# Patient Record
Sex: MALE | Race: WHITE | NOT HISPANIC OR LATINO | Employment: OTHER | URBAN - METROPOLITAN AREA
[De-identification: names, ages, dates, MRNs, and addresses within clinical notes are randomized per-mention and may not be internally consistent; named-entity substitution may affect disease eponyms.]

---

## 2017-02-15 ENCOUNTER — ALLSCRIPTS OFFICE VISIT (OUTPATIENT)
Dept: OTHER | Facility: OTHER | Age: 82
End: 2017-02-15

## 2017-02-15 DIAGNOSIS — R13.10 DYSPHAGIA: ICD-10-CM

## 2017-02-22 ENCOUNTER — OFFICE VISIT (OUTPATIENT)
Dept: SPEECH THERAPY | Facility: CLINIC | Age: 82
End: 2017-02-22
Payer: MEDICARE

## 2017-02-22 PROCEDURE — 92610 EVALUATE SWALLOWING FUNCTION: CPT

## 2017-02-22 PROCEDURE — G8996 SWALLOW CURRENT STATUS: HCPCS

## 2017-02-22 PROCEDURE — G8997 SWALLOW GOAL STATUS: HCPCS

## 2017-02-23 ENCOUNTER — GENERIC CONVERSION - ENCOUNTER (OUTPATIENT)
Dept: OTHER | Facility: OTHER | Age: 82
End: 2017-02-23

## 2017-03-03 ENCOUNTER — GENERIC CONVERSION - ENCOUNTER (OUTPATIENT)
Dept: OTHER | Facility: OTHER | Age: 82
End: 2017-03-03

## 2017-03-03 ENCOUNTER — HOSPITAL ENCOUNTER (OUTPATIENT)
Dept: RADIOLOGY | Facility: HOSPITAL | Age: 82
Discharge: HOME | End: 2017-03-03
Attending: FAMILY MEDICINE
Payer: MEDICARE

## 2017-03-03 DIAGNOSIS — R13.10 DYSPHAGIA: ICD-10-CM

## 2017-03-03 PROCEDURE — 92611 MOTION FLUOROSCOPY/SWALLOW: CPT

## 2017-03-03 PROCEDURE — G8996 SWALLOW CURRENT STATUS: HCPCS

## 2017-03-03 PROCEDURE — 74230 X-RAY XM SWLNG FUNCJ C+: CPT

## 2017-03-03 PROCEDURE — G8997 SWALLOW GOAL STATUS: HCPCS

## 2017-03-03 PROCEDURE — G8998 SWALLOW D/C STATUS: HCPCS

## 2017-03-10 ENCOUNTER — GENERIC CONVERSION - ENCOUNTER (OUTPATIENT)
Dept: OTHER | Facility: OTHER | Age: 82
End: 2017-03-10

## 2017-03-30 ENCOUNTER — ALLSCRIPTS OFFICE VISIT (OUTPATIENT)
Dept: OTHER | Facility: OTHER | Age: 82
End: 2017-03-30
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
## 2017-03-30 DIAGNOSIS — R26.9 ABNORMALITY OF GAIT AND MOBILITY: ICD-10-CM

## 2017-04-24 ENCOUNTER — GENERIC CONVERSION - ENCOUNTER (OUTPATIENT)
Dept: OTHER | Facility: OTHER | Age: 82
End: 2017-04-24

## 2017-06-02 ENCOUNTER — GENERIC CONVERSION - ENCOUNTER (OUTPATIENT)
Dept: OTHER | Facility: OTHER | Age: 82
End: 2017-06-02

## 2017-07-12 ENCOUNTER — ALLSCRIPTS OFFICE VISIT (OUTPATIENT)
Dept: OTHER | Facility: OTHER | Age: 82
End: 2017-07-12

## 2017-07-12 DIAGNOSIS — E11.9 TYPE 2 DIABETES MELLITUS WITHOUT COMPLICATIONS (HCC): ICD-10-CM

## 2017-07-12 DIAGNOSIS — F29 PSYCHOSIS NOT DUE TO SUBSTANCE OR KNOWN PHYSIOLOGICAL CONDITION (HCC): ICD-10-CM

## 2017-07-12 DIAGNOSIS — F79 INTELLECTUAL DISABILITY: ICD-10-CM

## 2017-08-02 ENCOUNTER — ALLSCRIPTS OFFICE VISIT (OUTPATIENT)
Dept: OTHER | Facility: OTHER | Age: 82
End: 2017-08-02

## 2017-08-04 ENCOUNTER — GENERIC CONVERSION - ENCOUNTER (OUTPATIENT)
Dept: OTHER | Facility: OTHER | Age: 82
End: 2017-08-04

## 2017-08-28 ENCOUNTER — GENERIC CONVERSION - ENCOUNTER (OUTPATIENT)
Dept: OTHER | Facility: OTHER | Age: 82
End: 2017-08-28

## 2017-09-11 ENCOUNTER — APPOINTMENT (OUTPATIENT)
Dept: AUDIOLOGY | Age: 82
End: 2017-09-11
Payer: MEDICARE

## 2017-10-11 ENCOUNTER — GENERIC CONVERSION - ENCOUNTER (OUTPATIENT)
Dept: OTHER | Facility: OTHER | Age: 82
End: 2017-10-11

## 2017-10-25 ENCOUNTER — ALLSCRIPTS OFFICE VISIT (OUTPATIENT)
Dept: OTHER | Facility: OTHER | Age: 82
End: 2017-10-25

## 2017-11-29 ENCOUNTER — GENERIC CONVERSION - ENCOUNTER (OUTPATIENT)
Dept: OTHER | Facility: OTHER | Age: 82
End: 2017-11-29

## 2017-12-15 ENCOUNTER — APPOINTMENT (EMERGENCY)
Dept: RADIOLOGY | Facility: HOSPITAL | Age: 82
End: 2017-12-15
Payer: MEDICARE

## 2017-12-15 ENCOUNTER — HOSPITAL ENCOUNTER (EMERGENCY)
Facility: HOSPITAL | Age: 82
Discharge: HOME | End: 2017-12-15
Attending: EMERGENCY MEDICINE | Admitting: EMERGENCY MEDICINE
Payer: MEDICARE

## 2017-12-15 VITALS
BODY MASS INDEX: 23.81 KG/M2 | RESPIRATION RATE: 20 BRPM | WEIGHT: 134.4 LBS | HEIGHT: 63 IN | TEMPERATURE: 97.7 F | DIASTOLIC BLOOD PRESSURE: 84 MMHG | HEART RATE: 76 BPM | OXYGEN SATURATION: 96 % | SYSTOLIC BLOOD PRESSURE: 192 MMHG
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
## 2017-12-15 PROCEDURE — 70450 CT HEAD/BRAIN W/O DYE: CPT

## 2017-12-15 PROCEDURE — 99284 EMERGENCY DEPT VISIT MOD MDM: CPT

## 2017-12-15 PROCEDURE — 72125 CT NECK SPINE W/O DYE: CPT

## 2017-12-15 PROCEDURE — 72170 X-RAY EXAM OF PELVIS: CPT

## 2017-12-15 PROCEDURE — 72070 X-RAY EXAM THORAC SPINE 2VWS: CPT

## 2017-12-15 RX ORDER — HYDROCHLOROTHIAZIDE 12.5 MG/1
12.5 CAPSULE, GELATIN COATED ORAL DAILY
COMMUNITY

## 2017-12-15 RX ORDER — OXYBUTYNIN CHLORIDE 5 MG/1
5 TABLET, EXTENDED RELEASE ORAL
COMMUNITY

## 2017-12-15 NOTE — ED PROVIDER NOTES
History  Chief Complaint   Patient presents with    Fall     Pt states was trying to get to computer and lost balance fell back hit head on carpet  Pt c/o posterior head pain     HPI    This is an 81 yo patient who presents with losing his balance anf falling, hitting his head  He has MR and resides in nursing facility  He had witnessed fall which they believe is secondary to losing his balance  He complained of posterior head pain  No blood thinners  No chest pain or sob, abdominal pain, urinary compliants  He is acting his baseline according to the manager at bedside  No compliants currently  No weakness  According to manager patient does fall when losing balance which has been chronic  Impression: 81 yo M after a fall, CT and xray and if stable at baseline will discharge home  Prior to Admission Medications   Prescriptions Last Dose Informant Patient Reported? Taking? ARIPiprazole (ABILIFY) 5 mg tablet   Yes Yes   Sig: Take 5 mg by mouth daily  Cholecalciferol (D3 ADULT PO)   Yes Yes   Sig: Take 2,000 Units by mouth daily  LORazepam (ATIVAN) 0 5 mg tablet   Yes Yes   Sig: Take 0 5 mg by mouth daily  alendronate (FOSAMAX) 70 mg tablet   Yes Yes   Sig: Take 70 mg by mouth every 7 days  Take in the morning with a full glass of water, on an empty stomach, and do not take anything else by mouth or lie down for the next 30 min  Last dose sat  1/9   aspirin 325 mg tablet   Yes Yes   Sig: Take 325 mg by mouth daily  benztropine (COGENTIN) 0 5 mg tablet   Yes Yes   Sig: Take 0 5 mg by mouth daily  budesonide-formoterol (SYMBICORT) 160-4 5 mcg/act inhaler   Yes Yes   Sig: Inhale 2 puffs 2 (two) times a day  calcium acetate (PHOSLO) 667 mg capsule   Yes Yes   Sig: Take 667 mg by mouth 2 (two) times a day     doxazosin (CARDURA) 2 mg tablet   Yes Yes   Sig: Take 2 mg by mouth daily at bedtime  ferrous sulfate 325 (65 Fe) mg tablet   Yes Yes   Sig: Take 325 mg by mouth daily with breakfast Monday      fluticasone (FLONASE) 50 mcg/act nasal spray   Yes Yes   Si spray into each nostril daily  hydrochlorothiazide (MICROZIDE) 12 5 mg capsule   Yes Yes   Sig: Take 12 5 mg by mouth daily   levothyroxine 50 mcg tablet   Yes Yes   Sig: Take 50 mcg by mouth daily  loratadine (CLARITIN) 10 mg tablet   Yes Yes   Sig: Take 10 mg by mouth daily  lovastatin (MEVACOR) 40 MG tablet   Yes Yes   Sig: Take 40 mg by mouth daily at bedtime  metFORMIN (GLUCOPHAGE) 1000 MG tablet   Yes Yes   Sig: Take 1,000 mg by mouth 2 (two) times a day with meals  montelukast (SINGULAIR) 10 mg tablet   Yes Yes   Sig: Take 10 mg by mouth daily At 4pm    omeprazole (PriLOSEC) 20 mg delayed release capsule   Yes Yes   Sig: Take 20 mg by mouth daily  oxybutynin (DITROPAN-XL) 5 mg 24 hr tablet   Yes Yes   Sig: Take 5 mg by mouth daily at bedtime   sertraline (ZOLOFT) 50 mg tablet   Yes Yes   Sig: Take 50 mg by mouth daily  sodium chloride (OCEAN) 0 65 % nasal spray   Yes Yes   Si spray into each nostril 3 (three) times a day as needed for congestion  tamsulosin (FLOMAX) 0 4 mg   Yes Yes   Sig: Take 0 4 mg by mouth daily with dinner  Facility-Administered Medications: None       Past Medical History:   Diagnosis Date    Angiodysplasia     Asthma     COPD (chronic obstructive pulmonary disease) (HCC)     Depression     Diabetes mellitus (HCC)     Disease of thyroid gland     Hypothyrodism    GERD (gastroesophageal reflux disease)     Heart murmur     Hyperlipidemia     Hypertension     Iron deficiency anemia     Osteoporosis        Past Surgical History:   Procedure Laterality Date     CYSTOURETHRO W/IMPLANT N/A 2016    Procedure: CYSTOSCOPY WITH INSERTION Gisselle Robison;  Surgeon: Ave Johnson MD;  Location: [facility];  Service: Urology       History reviewed  No pertinent family history  I have reviewed and agree with the history as documented      Social History   Substance Use Topics  Smoking status: Never Smoker    Smokeless tobacco: Never Used    Alcohol use No        Review of Systems  REVIEW OF SYSTEMS  Constitutional:  Denies fever or chills   Eyes:  Denies change in visual acuity   HENT:  Denies nasal congestion or sore throat   Respiratory:  Denies cough or shortness of breath   Cardiovascular:  Denies chest pain or edema   GI:  Denies abdominal pain, nausea, vomiting, bloody stools or diarrhea   :  Denies dysuria   Musculoskeletal:  Denies back pain or joint pain   Integument:  Denies rash   Neurologic:  Denies headache, focal weakness or sensory changes   Endocrine:  Denies polyuria or polydipsia   Lymphatic:  Denies swollen glands   Psychiatric:  Denies depression or anxiety     Physical Exam  ED Triage Vitals [12/15/17 1318]   Temperature Pulse Respirations Blood Pressure SpO2   97 7 °F (36 5 °C) 71 18 (!) 213/85 98 %      Temp src Heart Rate Source Patient Position - Orthostatic VS BP Location FiO2 (%)   -- Monitor Lying Right arm --      Pain Score       4           Orthostatic Vital Signs  Vitals:    12/15/17 1446 12/15/17 1530 12/15/17 1600 12/15/17 1630   BP: (!) 178/74 170/77 (!) 181/85 (!) 192/84   Pulse: 72 70 70 76   Patient Position - Orthostatic VS: Lying Sitting Sitting Lying       Physical Exam  PHYSICAL EXAM    Constitutional:  Well developed, well nourished, no acute distress, non-toxic appearance    HEENT:  Atraumatic, PERRL, conjunctiva normal  Oropharynx moist, no pharyngeal exudates  Neck- normal range of motion, no tenderness, supple   Respiratory:  No respiratory distress, normal breath sounds, no rales, no wheezing   Cardiovascular:  Normal rate, normal rhythm, no murmurs, no gallops, no rubs   GI:  Soft, nondistended, normal bowel sounds, nontender, no organomegaly, no mass, no rebound, no guarding   :  No costovertebral angle tenderness   Musculoskeletal:  No edema, no tenderness, no deformities   Back- small reddness midline back but no tenderness  Integument:  Well hydrated, no rash   Lymphatic:  No lymphadenopathy noted   Neurologic:  Alert & oriented x 3, CN 2-12 normal, normal motor function, normal sensory function, no focal deficits noted   Psychiatric:  Speech and behavior appropriate     ED Medications  Medications - No data to display    Diagnostic Studies  Results Reviewed     None                 XR pelvis ap only 1 or 2 views   Final Result by Alyssia Lee MD (12/15 1610)      No acute osseous findings  Workstation performed: [id number]         XR thoracic spine 2 views   Final Result by Alyssia Lee MD (12/15 1601)      1  No acute osseous findings  2   Osteopenia with chronic T11 compression deformity  No new fractures  Workstation performed: [id number]         CT cervical spine without contrast   Final Result by Kadeem Blunt MD (12/15 0409)      No cervical spine fracture or traumatic malalignment  Workstation performed: [id number]         CT head without contrast   Final Result by Kadeem Blunt MD (12/15 3956)      No acute intracranial abnormality  Workstation performed: [id number]               Procedures  Procedures      Phone Consults  ED Phone Contact    ED Course  ED Course            Identification of Seniors at [address] [place] [address] Most Recent Value   (ISAR) Identification of Seniors at Risk   Before the illness or injury that brought you to the Emergency, did you need someone to help you on a regular basis? 1 Filed at: 12/15/2017 1327   In the last 24 hours, have you needed more help than usual?  0 Filed at: 12/15/2017 1327   Have you been hospitalized for one or more nights during the past 6 months? 0 Filed at: 12/15/2017 1327   In general, do you see well?  0 Filed at: 12/15/2017 1327   In general, do you have serious problems with your memory? 1 Filed at: 12/15/2017 1327   Do you take more than three different medications every day?   1 Filed at: 12/15/2017 1327   ISAR Score  3 Filed at: 12/15/2017 1327                          [facility]  CritCare Time    Disposition  Final diagnoses:   Fall from standing     Time reflects when diagnosis was documented in both MDM as applicable and the Disposition within this note     Time User Action Codes Description Comment    12/15/2017  4:16 PM Luis Carlos Vega U  8  [W19  XXXA] Fall from standing       ED Disposition     ED Disposition Condition Comment    Discharge  Brittney Carlton discharge to home/self care  Condition at discharge: Good        Follow-up Information     Follow up With Specialties Details Why Contact Info    Amina Ramos MD [facility] Medicine Schedule an appointment as soon as possible for a visit As needed, If symptoms worsen [place] Sudeep Thorne   [phone]          Discharge Medication List as of 12/15/2017  4:16 PM      CONTINUE these medications which have NOT CHANGED    Details   alendronate (FOSAMAX) 70 mg tablet Take 70 mg by mouth every 7 days  Take in the morning with a full glass of water, on an empty stomach, and do not take anything else by mouth or lie down for the next 30 min  Last dose sat  1/9, Until Discontinued, Historical Med      ARIPiprazole (ABILIFY) 5 mg tablet Take 5 mg by mouth daily  , Until Discontinued, Historical Med      aspirin 325 mg tablet Take 325 mg by mouth daily  , Until Discontinued, Historical Med      benztropine (COGENTIN) 0 5 mg tablet Take 0 5 mg by mouth daily  , Until Discontinued, Historical Med      budesonide-formoterol (SYMBICORT) 160-4 5 mcg/act inhaler Inhale 2 puffs 2 (two) times a day , Until Discontinued, Historical Med      calcium acetate (PHOSLO) 667 mg capsule Take 1,334 mg by mouth 2 (two) times a day , Until Discontinued, Historical Med      Cholecalciferol (D3 ADULT PO) Take 2,000 Units by mouth daily  , Until Discontinued, Historical Med      doxazosin (CARDURA) 2 mg tablet Take 2 mg by mouth daily at bedtime  , Until Discontinued, Historical Med      ferrous sulfate 325 (65 Fe) mg tablet Take 325 mg by mouth daily with breakfast , Until Discontinued, Historical Med      fluticasone (FLONASE) 50 mcg/act nasal spray 1 spray into each nostril daily  , Until Discontinued, Historical Med      levothyroxine 50 mcg tablet Take 50 mcg by mouth daily  , Until Discontinued, Historical Med      loratadine (CLARITIN) 10 mg tablet Take 10 mg by mouth daily  , Until Discontinued, Historical Med      LORazepam (ATIVAN) 0 5 mg tablet Take 0 5 mg by mouth daily  , Until Discontinued, Historical Med      lovastatin (MEVACOR) 40 MG tablet Take 40 mg by mouth daily at bedtime  , Until Discontinued, Historical Med      metFORMIN (GLUCOPHAGE) 1000 MG tablet Take 1,000 mg by mouth 2 (two) times a day with meals  , Until Discontinued, Historical Med      montelukast (SINGULAIR) 10 mg tablet Take 10 mg by mouth daily at bedtime  , Until Discontinued, Historical Med      omeprazole (PriLOSEC) 20 mg delayed release capsule Take 20 mg by mouth daily  , Until Discontinued, Historical Med      sertraline (ZOLOFT) 50 mg tablet Take 50 mg by mouth daily  , Until Discontinued, Historical Med      sodium chloride (OCEAN) 0 65 % nasal spray 1 spray into each nostril 3 (three) times a day as needed for congestion  , Until Discontinued, Historical Med      tamsulosin (FLOMAX) 0 4 mg Take 0 4 mg by mouth daily with dinner , Until Discontinued, Historical Med      glimepiride (AMARYL) 2 mg tablet Take 2 mg by mouth every morning before breakfast , Until Discontinued, Historical Med           No discharge procedures on file  ED Provider  Attending physically available and evaluated Jayden Kerns  CRISTIANE managed the patient along with the ED Attending      Electronically Signed by         Monika Huerta MD  Resident  12/18/17 5284

## 2017-12-15 NOTE — DISCHARGE INSTRUCTIONS
Fall Prevention for Older Adults   AMBULATORY CARE:   Fall prevention  includes ways to make your home and other areas safer  It also includes ways you can move more carefully to prevent a fall  As you age, your muscles weaken and your risk for falls increases  Your risk also increases if you take medicines that make you sleepy or dizzy  You may also be at risk if you have vision or joint problems, have low blood pressure, or are not active  Call 911 or have someone else call if:   · You have fallen and are unconscious  · You have fallen and cannot move part of your body  Contact your healthcare provider if:   · You have fallen and have pain or a headache  · You have questions or concerns about your condition or care  Fall prevention tips:   · Stay active  Exercise can help strengthen your muscles and improve your balance  Your healthcare provider may recommend water aerobics, walking, or Karan Chi  He may also recommend physical therapy to improve your coordination  Never start an exercise program without asking your healthcare provider first     · Wear shoes that fit well and have soles that   Wear shoes both inside and outside  Use slippers with good   Avoid shoes with high heels  · Use assistive devices as directed  Your healthcare provider may suggest that you use a cane or walker to help you keep your balance  You may need to have grab bars put in your bathroom near the toilet or in the shower  · Stand or sit up slowly  This may help you keep your balance and prevent falls  · Wear a personal alarm  This is a device that allows you to call 911 if you need help  Ask for more information on personal alarms  · Manage your medical conditions  Keep all appointments with your healthcare providers  Visit your eye doctor as directed  Home safety tips:   · Add items to prevent falls in the bathroom  Put nonslip strips on your bath or shower floor to prevent you from slipping   Use a bath mat if you do not have carpet in the bathroom  This will prevent you from falling when you step out of the bath or shower  Use a shower seat so you do not need to stand while you shower  Sit on the toilet or a chair in your bathroom to dry yourself and put on clothing  This will prevent you from losing your balance from drying or dressing yourself while you are standing  · Keep paths clear  Remove books, shoes, and other objects from walkways and stairs  Place cords for telephones and lamps out of the way so that you do not need to walk over them  Tape them down if you cannot move them  Remove small rugs  If you cannot remove a rug, secure it with double-sided tape  This will prevent you from tripping  · Install bright lights in your home  Use night lights to help light paths to the bathroom or kitchen  Always turn on the light before you start walking  · Keep items you use often on shelves within reach  Do not use a step stool to help you reach an item  · Paint or place reflective tape on the edges of your stairs  This will help you see the stairs better  Follow up with your healthcare provider as directed:  Write down your questions so you remember to ask them during your visits  © 2017 [employer] Information is for End User's use only and may not be sold, redistributed or otherwise used for commercial purposes  All illustrations and images included in CareNotes® are the copyrighted property of [employer]  or [employer]le [employer]  The above information is an  only  It is not intended as medical advice for individual conditions or treatments  Talk to your doctor, nurse or pharmacist before following any medical regimen to see if it is safe and effective for you

## 2017-12-15 NOTE — ED ATTENDING ATTESTATION
Demetrius Phan MD, saw and evaluated the patient  I have discussed the patient with the resident/non-physician practitioner and agree with the resident's/non-physician practitioner's findings, Plan of Care, and MDM as documented in the resident's/non-physician practitioner's note, except where noted  All available labs and Radiology studies were reviewed  At this point I agree with the current assessment done in the Emergency Department    I have conducted an independent evaluation of this patient a history and physical is as follows:     Pt was walking toward computer and fell hit his head no loc co abrasion to head PE: alert nad heart reg lugns clear abd soft nontender MDM: will co ct head and neck  Critical Care Time  CritCare Time

## 2017-12-22 ENCOUNTER — GENERIC CONVERSION - ENCOUNTER (OUTPATIENT)
Dept: OTHER | Facility: OTHER | Age: 82
End: 2017-12-22

## 2017-12-22 ENCOUNTER — ALLSCRIPTS OFFICE VISIT (OUTPATIENT)
Dept: OTHER | Facility: OTHER | Age: 82
End: 2017-12-22

## 2017-12-22 ENCOUNTER — APPOINTMENT (OUTPATIENT)
Dept: LAB | Facility: HOSPITAL | Age: 82
End: 2017-12-22
Payer: MEDICARE

## 2017-12-22 DIAGNOSIS — R10.9 ABDOMINAL PAIN: ICD-10-CM

## 2017-12-22 DIAGNOSIS — Z91.81 HISTORY OF FALLING: ICD-10-CM

## 2017-12-22 LAB
BACTERIA UR QL AUTO: NORMAL /HPF
BILIRUB UR QL STRIP: NEGATIVE
BILIRUB UR QL STRIP: NEGATIVE
CLARITY UR: CLEAR
CLARITY UR: NORMAL
COLOR UR: YELLOW
COLOR UR: YELLOW
GLUCOSE (HISTORICAL): 2000
GLUCOSE UR STRIP-MCNC: ABNORMAL MG/DL
HGB UR QL STRIP.AUTO: NEGATIVE
HGB UR QL STRIP.AUTO: NEGATIVE
HYALINE CASTS #/AREA URNS LPF: NORMAL /LPF
KETONES UR STRIP-MCNC: NEGATIVE MG/DL
KETONES UR STRIP-MCNC: NEGATIVE MG/DL
LEUKOCYTE ESTERASE UR QL STRIP: ABNORMAL
LEUKOCYTE ESTERASE UR QL STRIP: NEGATIVE
NITRITE UR QL STRIP: NEGATIVE
NITRITE UR QL STRIP: NEGATIVE
NON-SQ EPI CELLS URNS QL MICRO: NORMAL /HPF
PH UR STRIP.AUTO: 6 [PH]
PH UR STRIP.AUTO: 6 [PH] (ref 4.5–8)
PROT UR STRIP-MCNC: NEGATIVE MG/DL
PROT UR STRIP-MCNC: NEGATIVE MG/DL
RBC #/AREA URNS AUTO: NORMAL /HPF
SP GR UR STRIP.AUTO: 1.02
SP GR UR STRIP.AUTO: 1.02 (ref 1–1.03)
UROBILINOGEN UR QL STRIP.AUTO: 0.2
UROBILINOGEN UR QL STRIP.AUTO: 0.2 E.U./DL
WBC #/AREA URNS AUTO: NORMAL /HPF

## 2017-12-22 PROCEDURE — 81001 URINALYSIS AUTO W/SCOPE: CPT

## 2018-01-10 ENCOUNTER — EVALUATION (OUTPATIENT)
Dept: PHYSICAL THERAPY | Facility: REHABILITATION | Age: 83
End: 2018-01-10
Payer: MEDICARE

## 2018-01-10 PROCEDURE — G8990 OTHER PT/OT CURRENT STATUS: HCPCS

## 2018-01-10 PROCEDURE — 97161 PT EVAL LOW COMPLEX 20 MIN: CPT

## 2018-01-10 PROCEDURE — G8991 OTHER PT/OT GOAL STATUS: HCPCS

## 2018-01-10 NOTE — PROGRESS NOTES
Assessment    1  Need for pneumococcal vaccination (V03 82) (Z23)   2  Well adult on routine health check (V70 0) (Z00 00)   3  Type 2 diabetes mellitus (250 00) (E11 9)   4  Hypertension (401 9) (I10)    Plan  DMII (diabetes mellitus, type 2)    · Alcohol Swabs Pad; USE DAILY AS DIRECTED DX E11 9  Need for pneumococcal vaccination    · Prevnar 13 Intramuscular Suspension    Discussion/Summary  Impression: health maintenance visit, healthy adult male  Currently, he eats a healthy diet  Prostate cancer screening: the risks and benefits of prostate cancer screening were discussed and PSA is not indicated  Colorectal cancer screening: colorectal cancer screening is current  The Flu, prevnar 13 given today  Patient discussion: Caregivers  79 yo male  1  Health Maintenance - Prevnar, Flu today  UTD otherwise  HM form as above  Physical forms completed  2  DMII - a1c <8%, cont metformin  3  HTN - SBP's elevated with facility automatic cuff, but at goal here  Asked for manual BPs at facility for next week with phone call next week to tell me readings and make any adjustments  RTC 3 months  Possible side effects of new medications were reviewed with the patient/guardian today  Self Referrals: No      Chief Complaint  annual physical      History of Present Illness  HM, Adult Male: The patient is being seen for a health maintenance evaluation  The last health maintenance visit was 1 year(s) ago  General Health: The patient's health since the last visit is described as good  He has regular dental visits  He denies vision problems  He has hearing loss  Immunizations status: up to date   Would like Prevnar, flu  Lifestyle:  He consumes a diverse and healthy diet  He does not have any weight concerns  He does not use tobacco  He denies alcohol use  He denies drug use  Reproductive health:  the patient is not sexually active  Screening: cancer screening reviewed and current     metabolic screening reviewed and current  risk screening reviewed and current  HPI: 79 yo male with intellectual d/a, DM and HTN presents for health maintenance visit  No new issues  Review of Systems    Constitutional: no fever, not feeling poorly, no chills and not feeling tired  Eyes: no eyesight problems  ENT: no nasal discharge  Cardiovascular: no chest pain and no extremity edema  Respiratory: no shortness of breath and no cough  Gastrointestinal: no nausea, no vomiting and no diarrhea  Genitourinary: incontinence, but no dysuria  Musculoskeletal: no arthralgias and no myalgias  Integumentary: no rashes  Neurological: no convulsions  Active Problems    1  Abnormal gait (781 2) (R26 9)   2  Abnormal weight loss (783 21) (R63 4)   3  Actinic keratosis (702 0) (L57 0)   4  Acute Airway Aspiration (934 9)   5  Allergic rhinitis (477 9) (J30 9)   6  Anemia (285 9) (D64 9)   7  Asthma (493 90) (J45 909)   8  Back pain (724 5) (M54 9)   9  Benign prostate hyperplasia (600 00) (N40 0)   10  Bladder incontinence (788 30) (R32)   11  Cardiac murmur (785 2) (R01 1)   12  Cerumen impaction (380 4) (H61 20)   13  Chronic obstructive pulmonary disease (496) (J44 9)   14  Constipation (564 00) (K59 00)   15  Diabetes type 2, uncontrolled (250 02) (E11 65)   16  Diarrhea (787 91) (R19 7)   17  DMII (diabetes mellitus, type 2) (250 00) (E11 9)   18  Dysphagia (787 20) (R13 10)   19  Esophageal reflux (530 81) (K21 9)   20  Hypercholesterolemia (272 0) (E78 00)   21  Hyperkalemia (276 7) (E87 5)   22  Hypertension (401 9) (I10)   23  Hypothyroidism (244 9) (E03 9)   24  Incontinence (788 30) (R32)   25  Insect bite (919 4,E906 4) (W57 XXXA)   26  Intellectual disability (319) (F79)   27  Lumbar contusion, initial encounter (922 31) (S30 0XXA)   28  Murmur (785 2) (R01 1)   29  Muscle spasm of both lower legs (728 85) (M62 048)   30  Need for influenza vaccination (V04 81) (Z23)   31   Need for vaccination for DTP (V06 1) (Z23)   32  Osteoporosis (733 00) (M81 0)   33  Other urinary incontinence (788 39) (N39 498)   34  Pre-op evaluation (V72 84) (Z01 818)   35  Psychosis (298 9) (F29)   36  Screening (V82 9) (Z13 9)   37  Screening for prostate cancer (V76 44) (Z12 5)   38  Tremor (781 0) (R25 1)   39  Type 2 diabetes mellitus (250 00) (E11 9)   40  Upper respiratory infection (465 9) (J06 9)   41  Urinary incontinence, unspecified type (788 30) (R32)    Past Medical History    · History of Hemochromatosis (275 03) (E83 119)   · Need for vaccination for DTP (V06 1) (Z23)    Surgical History    · History of Anal Fistulectomy   · History of Cataract Surgery    Family History  Mother    · No pertinent family history  Family History    · Family history of No Significant Family History    Social History    · Denied: History of Alcohol Use (History)   · Denied: History of Drug Use   · Insect bite (919 4,E906 4) (W57 XXXA)   · Never A Smoker   · Uses Safety Equipment - Seatbelts    Current Meds   1  Abilify 5 MG Oral Tablet; Therapy: 55BDG6353 to (Last FI:46WNQ9331)  Requested for: 65PLC1549 Ordered   2  Alendronate Sodium 70 MG Oral Tablet; Therapy: 98RYS5050 to (Last Rx:12Oct2010)  Requested for: 59Tsq4254 Ordered   3  Aspirin  MG Oral Tablet Delayed Release; Therapy: 53PRA5755 to (Last QO:62NSH7195)  Requested for: 68RAT4444 Ordered   4  BD Swab Single Use Regular Pad; USE FOR GLUCOSE TESTING (DIABETES); Therapy: 05KNT8102 to (Evaluate:00Ega8401)  Requested for: 19WBP4958; Last   Rx:18Uyj6272 Ordered   5  Benztropine Mesylate 0 5 MG Oral Tablet; Therapy: 03TIR7713 to (Last YL:81BHI8633)  Requested for: 90XDC5360 Ordered   6  Calcium Acetate 667 MG Oral Capsule; Therapy: 91EEC0149 to (Last HY:22HGO5239)  Requested for: 72BZH5171 Ordered   7  Debrox 6 5 % Otic Solution; INSTILL 5 DROPS IN EACH EAR TWICE A DAY FOR 4   DAYS AS NEEDED FOR EAR WAX; Therapy: (Shireen Martinez) to Recorded   8   Denta 5000 Plus 1 1 % Dental Cream;   Therapy: 62IPY1198 to (Last Rx:24Uzk1198)  Requested for: 44DKT4022 Ordered   9  Doxazosin Mesylate 2 MG Oral Tablet; Therapy: 88CLI4908 to (Last Rx:67Gtg7928)  Requested for: 51VGU5397 Ordered   10  Easy Touch Lancets 32G/Twist Miscellaneous; TWICE DAILY TO CHECK BS PRN DX    48076/E11 65; Therapy: 49MQY6018 to (Evaluate:09Cds0276)  Requested for: 40HYY2811; Last    Rx:84Hrw7910 Ordered   11  Ferrous Sulfate 325 (65 Fe) MG Oral Tablet; Therapy: 77KNY6430 to (Last RO:70LRI3705)  Requested for: 93FHP8027 Ordered   12  Flomax 0 4 MG Oral Capsule; one capsule by mouth at bedtime for bph; Therapy: 52IZB5251 to Recorded   13  Fluticasone Propionate 50 MCG/ACT Nasal Suspension; Therapy: 39BQY2929 to (Last Rx:45Ypi6560)  Requested for: 82Wbu5856 Ordered   14  Glimepiride 2 MG Oral Tablet; TAKE 1 TABLET DAILY WITH BREAKFAST; Therapy: 67XSM8668 to (Evaluate:86Vwf5125); Last Rx:53Zzn4361 Ordered   15  Lactase 3000 UNIT TABS; Therapy: (Recorded:05Jun2013) to Recorded   16  Levothyroxine Sodium 50 MCG Oral Tablet; Therapy: 60BRO0496 to (Last KL:55PYJ1287)  Requested for: 62AIF9845 Ordered   17  Loperamide HCl - 2 MG Oral Capsule; take 2 caps  by mouth after 1st episode of    diarrhea then take one capsule by mouth after each loose stool for 2 days as needed; Therapy: 60QWD5363 to Recorded   18  Loratadine 10 MG Oral Tablet; TAKE 1 TABLET DAILY IN THE MORNING FOR    ALLERGIES; Therapy: (Crystal Davian) to Recorded   19  LORazepam 0 5 MG Oral Tablet; Therapy: 11GEH5442 to (Last Rx:09Jun2011)  Requested for: 93PJX2658 Ordered   20  Lovastatin 40 MG Oral Tablet; Therapy: 97MVI4989 to (Last ID:39QEF1415)  Requested for: 06HXT8971 Ordered   21  MetFORMIN HCl - 1000 MG Oral Tablet; Therapy: 12YQZ8506 to (Last IL:27THQ1638)  Requested for: 95VXB7737 Ordered   22   Milk of Magnesia 1200 MG/15ML Oral Suspension; TAKE 30 ML Bedtime PRN    constipation to be done nightly until effective after no BM for 3 days; Therapy: 65LWG9044 to (Evaluate:21Kna0233)  Requested for: 05LSC5954; Last    Rx:18Nov2015 Ordered   23  Omeprazole 20 MG Oral Capsule Delayed Release; Therapy: 84TDZ1931 to (Last FA:59SBS0359)  Requested for: 99GQK0058 Ordered   24  Protective Underwear Sm/Med Miscellaneous; use daily as directed dx r32; Therapy: 73Vem4837 to (Evaluate:43Ouf1574)  Requested for: 16Lgq8728; Last    Rx:16Eik6489 Ordered   25  Robitussin -10 MG/5ML Oral Syrup; TAKE 10 ML  EVERY 4-6 HOURS AS    NEEDED; Therapy: 68SZC6176 to Recorded   26  Saline Mist Spray 0 65 % Nasal Solution; one spray in each nostril three times a day; Therapy: 90VJZ7099 to Recorded   27  Sertraline HCl - 50 MG Oral Tablet; Therapy: 37KIU8226 to (Last YV:28RLF2910)  Requested for: 16TKF3409 Ordered   28  Singulair 10 MG Oral Tablet; Therapy: 21KIV6734 to (Last JEN:31DDW5124)  Requested for: 06HEX0545 Ordered   29  Symbicort 160-4 5 MCG/ACT Inhalation Aerosol; INHALE 2 PUFFS TWICE DAILY  RINSE MOUTH AFTER USE; Therapy: 88ZDD8759 to (Last Rx:06Jun2013)  Requested for: 06Jun2013 Ordered   30  TRUEtest Test In Vitro Strip; Test blood glucose once in the AM before breakfast and    then again at 4 PM;    Therapy: 47MMX1795 to (Evaluate:13Oct2016)  Requested for: 77XRD9111; Last    Rx:19Oct2015 Ordered   31  Tylenol 325 MG Oral Tablet; Therapy: (Recorded:05Jun2013) to Recorded   32  Vitamin D3 2000 UNIT Oral Capsule; Therapy: 57RME1904 to (Last Rx:10Jan2012)  Requested for: 53BHA7773 Ordered    Allergies    1  Carbepenems   2  Cephalosporins   3  Levaquin TABS   4  Penicillins    5  Dairy   6   Other    Vitals   Recorded: 63LSF4982 85:14FO   Systolic 150, LUE, Sitting   Diastolic 80, LUE, Sitting   Heart Rate 74   Respiration 12   Temperature 97 9 F, Tympanic   Pain Scale 0   Height 5 ft 4 in   Weight 139 lb    BMI Calculated 23 86   BSA Calculated 1 68     Physical Exam    Constitutional   General appearance: No acute distress, well appearing and well nourished  Head and Face   Head and face: Normal     Palpation of the face and sinuses: No sinus tenderness  Neck   Neck: Supple, symmetric, trachea midline, no masses  Pulmonary   Respiratory effort: No increased work of breathing or signs of respiratory distress  Auscultation of lungs: Clear to auscultation  Cardiovascular   Auscultation of heart: Normal rate and rhythm, normal S1 and S2, no murmurs  Abdomen   Abdomen: Non-tender, no masses  Lymphatic   Palpation of lymph nodes in neck: No lymphadenopathy  Skin   Skin and subcutaneous tissue: Normal without rashes or lesions  Neurologic baseline  Psychiatric baseline  Mood and affect: Normal   baseline  Results/Data  (1) HEMOGLOBIN A1C 51Ffh9193 12:00AM [facility]     Test Name Result Flag Reference   HEMOGLOBIN A1C 7 7       (1) HEMOGLOBIN A1C 75YYZ7691 12:00AM [employer]     Test Name Result Flag Reference   HEMOGLOBIN A1C 7 1         Signatures   Electronically signed by :  Salud Cotton MD; Oct 20 2016  4:52PM EST                       (Author)

## 2018-01-10 NOTE — RESULT NOTES
Verified Results  * DXA BONE DENSITY SPINE HIP AND PELVIS 80OWY2161 06:27PM [facility]     Test Name Result Flag Reference   DXA BONE DENSITY SPINE HIP AND PELVIS (Report)     CENTRAL DXA SCAN     CLINICAL HISTORY:  80year old  male with no significant past medical history  TECHNIQUE: Bone densitometry was performed using a [employer] Horizon A  bone densitometer  Regions of interest appear properly placed  There are no obvious fractures or other confounding variables which could limit the study  Degenerative changes of the   lumbar spine and hip  This will falsely elevate the bone mineral densities in these regions  COMPARISON: May 15, 2014     RESULTS:    LUMBAR SPINE: L1-L4:   BMD 0 759 gm/cm2   T-score -3 0   Z-score -1 7     LEFT TOTAL HIP:   BMD 0 655 gm/cm2   T-score -2 5   Z-score -1 3     LEFT FEMORAL NECK:   BMD 0 530 gm/cm2   T-score -2 9   Z-score -1 3     LEFT FOREARM :   BMD 0 687 gm/sq-cm,   T-score is -2 4   Z-score is -0 3        ASSESSMENT:   1  Based on the WHO classification, the T-score of -3 0 in the lumbar spine is consistent with osteoporosis  2  Since the prior study, there has been a decrease in the bone mineral densities of the lumbar spine and left hip  There has been no significant change in the bone mineral density of the left forearm  It should be noted however that the examinations    were performed on dissimilar DXA units  3  According to the [address] [place] [address], prescription therapy is recommended with a T-score of -2 5 or less in the spine or hip after appropriate evaluation to exclude secondary causes  4  A daily intake of at least 1200 mg Calcium and 800 to 1000 IU of Vitamin D, as well as weight bearing and muscle strengthening exercise, fall prevention and avoidance of tobacco and excessive alcohol intake as basic preventive measures are    suggested  5  Repeat DXA scan in 18-24 months as clinically indicated             WHO CLASSIFICATION:   Normal (a T-score of -1 0 or higher)   Low bone mineral density (a T-score of less than -1 0 but higher than -2 5)   Osteoporosis (a T-score of -2 5 or less)   Severe osteoporosis (a T-score of -2 5 or less with a fragility fracture)             Workstation performed: [id number]

## 2018-01-10 NOTE — PROGRESS NOTES
Assessment    1  Encounter for preventive health examination (V70 0) (Z00 00)    Discussion/Summary  health maintenance visit Currently, he eats a healthy diet  Prostate cancer screening: the risks and benefits of prostate cancer screening were discussed and PSA is not indicated  Testicular cancer screening: testicular cancer screening is not indicated  Colorectal cancer screening: colorectal cancer screening is not indicated  The immunizations are up to date  Advice and education were given regarding fall risk reduction and vitamin D supplements  Patient discussion: discussed with the patient  84M here for Health Maintenance Visit:  1  Need for hospital bed: has multiple indications that limit his functional status  He has COPD and required HOB elevation to decrease work of breathing  Also he has spinal kyphoscoliosis that impairs his functional status and causes pain and would benefit from assistance with a hospital bed for transferring him  2  DM2: last A1c was 8 1% with mricoalbuminuria on 7/24/17, will recheck A1c at next visit in 1 month  Will discontinue Glimepiride to prevent hypoglycemic episodes  Only have caregiver check FBG for hypoglycemic episodes, does not need daily checks  Cont Metformin  3  HTN: BP well controlled, continue HCTZ  The patient, patient's caretaker was counseled regarding instructions for management, patient and family education, risks and benefits of treatment options, importance of compliance with treatment  Possible side effects of new medications were reviewed with the patient/guardian today  The treatment plan was reviewed with the patient/guardian  The patient/guardian understands and agrees with the treatment plan      Chief Complaint  Routine visit and review of lab results      History of Present Illness  HM, Adult Male: The patient is being seen for a health maintenance evaluation  The last health maintenance visit was 1yr year(s) ago  General Health:  The patient's health since the last visit is described as fair  He does not have regular dental visits  He complains of vision problems  He has hearing loss  Immunizations status: up to date  Lifestyle:  He consumes a diverse and healthy diet  He does not have any weight concerns  He does not exercise regularly  He does not use tobacco  He denies alcohol use  He denies drug use  Reproductive health:  the patient is not sexually active  Screening: cancer screening reviewed and updated  metabolic screening reviewed and current  HPI: 80 y M, with care giver from [facility] for health maintenance exam, and follow up on his labs ordered last month  Due to hearing and cognitive challenges, history is obtained from/through caregiver  Caregiver reports recent EGD and a new diagnosis of José Manuel's Esophagus  Pt has also brought a log of BPs , have all been in normal range with SBP of 120s  Patient denies chest pain, shortness of breath, diarrhea, constipation, signs and symptoms of hypo or hyperglycemia  Caregiver reports that in the past he used to get "the shakes" when his blood sugar was high, which would prompt her to check FSG and it would be in the range of 200, he's had no similar episode in the last 3 months  He is compliant with his medications and repots good appetite and a well balance diet  He is continuing to see PT and his balance is stable/improving  ROS otherwise negative for acute conditions  His is currently on Glimeperide and Metformin and compliant with meds  Review of Systems    Constitutional: no fever, not feeling poorly, no recent weight gain, no chills, not feeling tired and no recent weight loss  Eyes: no eye pain and no eyesight problems  ENT: no hearing loss  Respiratory: shortness of breath during exertion, but no shortness of breath, no cough and no orthopnea  Genitourinary: incontinence and nocturia     Musculoskeletal: no arthralgias, no joint swelling, no limb pain, no myalgias, no joint stiffness and no limb swelling  The patient presents with complaints of no skin lesions (new lesion approximate 0 5 inch diamater on lateral aspect of distal left leg)  Neurological: difficulty walking, but no numbness and no tingling  Psychiatric: not suicidal, no anxiety, no sleep disturbances and no depression  Active Problems    1  Abnormal gait (781 2) (R26 9)   2  Abnormal swallowing (787 20) (R13 10)   3  Actinic keratosis (702 0) (L57 0)   4  Acute Airway Aspiration (934 9)   5  Allergic rhinitis (477 9) (J30 9)   6  Anemia (285 9) (D64 9)   7  Asthma (493 90) (J45 909)   8  Back pain (724 5) (M54 9)   9  Benign prostate hyperplasia (600 00) (N40 0)   10  Bilateral impacted cerumen (380 4) (H61 23)   11  Bladder incontinence (788 30) (R32)   12  Cardiac murmur (785 2) (R01 1)   13  Cerumen impaction (380 4) (H61 20)   14  Chronic obstructive pulmonary disease (496) (J44 9)   15  Constipation (564 00) (K59 00)   16  Diabetes type 2, uncontrolled (250 02) (E11 65)   17  Diarrhea (787 91) (R19 7)   18  DMII (diabetes mellitus, type 2) (250 00) (E11 9)   19  Dysphagia (787 20) (R13 10)   20  Esophageal reflux (530 81) (K21 9)   21  Fall, subsequent encounter (V58 89,E888 9) (W19 XXXD)   25  Hypercholesterolemia (272 0) (E78 00)   23  Hyperkalemia (276 7) (E87 5)   24  Hypertension (401 9) (I10)   25  Hypothyroidism (244 9) (E03 9)   26  Incontinence (788 30) (R32)   27  Insect bite (919 4,E906 4) (W57 XXXA)   28  Intellectual disability (319) (F79)   29  Lumbar contusion, initial encounter (922 31) (S30 0XXA)   30  Murmur (785 2) (R01 1)   31  Muscle spasm of both lower legs (728 85) (M62 838)   32  Need for influenza vaccination (V04 81) (Z23)   33  Need for pneumococcal vaccination (V03 82) (Z23)   34  Need for vaccination for DTP (V06 1) (Z23)   35  Osteoporosis (733 00) (M81 0)   36  Other urinary incontinence (788 39) (N39 498)   37   Pre-op evaluation (V72 84) (Z01 818) 38  Psychosis (298 9) (F29)   39  Screening (V82 9) (Z13 9)   40  Screening for prostate cancer (V76 44) (Z12 5)   41  Tremor (781 0) (R25 1)   42  Type 2 diabetes mellitus (250 00) (E11 9)   43  Ulcer of lower extremity, unspecified laterality, with fat layer exposed (707 10) (L97 902)   44  Upper respiratory infection (465 9) (J06 9)   45  Urinary incontinence, unspecified type (788 30) (R32)    Past Medical History    · History of Hemochromatosis (275 03) (E83 119)   · History of abnormal weight loss (V13 89) (G74 830)   · Need for vaccination for DTP (V06 1) (Z23)   · History of Well adult on routine health check (V70 0) (Z00 00)    Surgical History    · History of Anal Fistulectomy   · History of Cataract Surgery    Family History  Mother    · No pertinent family history  Family History    · Family history of No Significant Family History    Social History    · Denied: History of Alcohol Use (History)   · Denied: History of Drug Use   · Insect bite (919 4,E906 4) (W57 XXXA)   · Never A Smoker   · Uses Safety Equipment - Seatbelts    Current Meds   1  Abilify 5 MG Oral Tablet; Therapy: 25WUF6069 to (Last GS:43BFZ9389)  Requested for: 17WUU7412 Ordered   2  Alcohol Swabs Pad; USE DAILY AS DIRECTED DX E11 9; Therapy: 63LVT2471 to (Evaluate:15Oct2017); Last Rx:20Oct2016 Ordered   3  Alendronate Sodium 70 MG Oral Tablet; Therapy: 79QIV1160 to (Last Rx:77Fnp5521)  Requested for: 12Oct2010 Ordered   4  ARIPiprazole 5 MG Oral Tablet; One tablet daily; Therapy: 57WAD2872 to Recorded   5  Aspirin  MG Oral Tablet Delayed Release; Therapy: 13MTI2639 to (Last XW:90BYM3703)  Requested for: 73GVW8708 Ordered   6  BD Swab Single Use Regular Pad; USE FOR GLUCOSE TESTING (DIABETES); Therapy: 43WWY7533 to (Evaluate:58Ezf2046)  Requested for: 51MMG9357; Last   Rx:48Faq5142 Ordered   7  Benztropine Mesylate 0 5 MG Oral Tablet; Therapy: 47ZGH5345 to (Last Rx:78Uvb8764)  Requested for: 86NVU0015 Ordered   8  Calcium Acetate 667 MG Oral Capsule; Therapy: 10MHA6213 to (Last C79LNG9286)  Requested for: 49AID7343 Ordered   9  Debrox 6 5 % Otic Solution; INSTILL 5 DROPS IN EACH EAR TWICE A DAY FOR 4   DAYS AS NEEDED FOR EAR WAX; Therapy: (Recorded:2013) to Recorded   10  Denta 5000 Plus 1 1 % Dental Cream;    Therapy: 79UZQ5658 to (Last Rx:26Cbh6712)  Requested for: 34WPW5006 Ordered   11  Doxazosin Mesylate 2 MG Oral Tablet; Therapy: 41ZAL6906 to (Last AF:74OUI5702)  Requested for: 75WCO0263 Ordered   12  Easy Touch Lancets 32G/Twist Miscellaneous; TWICE DAILY TO CHECK BS PRN DX    95247/E11 65; Therapy: 30WEW8161 to (Evaluate:25Xtx1441)  Requested for: 54CXI0001; Last    Rx:14Ppo5544 Ordered   13  Ferrous Sulfate 325 (65 Fe) MG Oral Tablet; Therapy: 87OMJ5292 to (Last QA:05ZLE8069)  Requested for: 92KZR0751 Ordered   14  Flomax 0 4 MG Oral Capsule; one capsule by mouth at bedtime for bph; Therapy: 95LGJ6881 to Recorded   15  Fluticasone Propionate 50 MCG/ACT Nasal Suspension; Therapy: 24GXQ9218 to (Last Rx:2010)  Requested for: 2010 Ordered   16  HydroCHLOROthiazide 12 5 MG Oral Capsule; TAKE 1 CAPSULE BY MOUTH ONCE    DAILY (HTN); Therapy: 56FWG1086 to (Last Rx:2017)  Requested for: 2017 Ordered   17  Lactase 3000 UNIT TABS; Therapy: (Emily Fears) to Recorded   18  Levothyroxine Sodium 50 MCG Oral Tablet; Therapy: 20NCJ4944 to (Last GB:49KTS4669)  Requested for: 07IVV2855 Ordered   19  Loperamide HCl - 2 MG Oral Capsule; take 2 caps  by mouth after 1st episode of    diarrhea then take one capsule by mouth after each loose stool for 2 days as needed; Therapy: 94SBJ3246 to Recorded   20  Loratadine 10 MG Oral Tablet; TAKE 1 TABLET DAILY IN THE MORNING FOR    ALLERGIES; Therapy: (Emily Fears) to Recorded   21  LORazepam 0 5 MG Oral Tablet; Therapy: 07PDL2346 to (Last Rx:2011)  Requested for: 12RSE5239 Ordered   22   Lovastatin 40 MG Oral Tablet; Therapy: 27PSM6692 to (Last KF:25WST1454)  Requested for: 03VXW5372 Ordered   23  Medlance Plus Lite 25G Miscellaneous; TEST BS QD IN AM BEFORE BREAKFAST  NOTIFY PCP IF <50 OR >400 E11 9; Therapy: 93KNO1445 to (Evaluate:15Oct2017); Last Rx:61Tgn0322 Ordered   24  MetFORMIN HCl - 1000 MG Oral Tablet; Therapy: 81QVC5742 to (Last XK:20TVF0944)  Requested for: 69ANL2923 Ordered   25  Milk of Magnesia 1200 MG/15ML Oral Suspension; TAKE 30 ML Bedtime PRN    constipation to be done nightly until effective after no BM for 3 days; Therapy: 65MAI1179 to (Evaluate:13Fuy0041)  Requested for: 75EEZ5050; Last    Rx:18Nov2015 Ordered   26  Montelukast Sodium 10 MG Oral Tablet; take one tablet once daily at 4pm;    Therapy: 36ESW6811 to Recorded   27  Nitrile Gloves Medium Miscellaneous; MEDIUM LATEX FREE GLOVES POWDER FREE    USE AS DIRECTED r32; Therapy: 51WXZ0444 to (Evaluate:15Oct2017); Last Rx:56Pvl4059 Ordered   28  Omeprazole 20 MG Oral Capsule Delayed Release; Therapy: 61GBA6705 to (Last NQ:30FKS9189)  Requested for: 93HDF3173 Ordered   29  Oxybutynin Chloride 5 MG Oral Tablet; take one tablet at bedtime; Therapy: 08EDD5309 to Recorded   30  Protective Underwear Sm/Med Miscellaneous; use daily as directed dx r32; Therapy: 64Cxx1754 to (Evaluate:15Oct2017)  Requested for: 17MBR3396; Last    Rx:39Jtc6892 Ordered   31  Robitussin -10 MG/5ML SYRP; TAKE 10 ML  EVERY 4-6 HOURS AS NEEDED; Therapy: 55FNA5741 to Recorded   32  Saline Mist Spray 0 65 % Nasal Solution; one spray in each nostril three times a day; Therapy: 52AKI2570 to Recorded   33  Sertraline HCl - 50 MG Oral Tablet; Therapy: 48YRY7087 to (Last CQ:67WTJ6079)  Requested for: 97BAB2871 Ordered   34  Singulair 10 MG Oral Tablet; Therapy: 45DMD9954 to (Last TE:43CXQ5873)  Requested for: 58UVN4804 Ordered   35   Symbicort 160-4 5 MCG/ACT Inhalation Aerosol; USE 2 PUFFS TWICE A DAY (RINSE    MOUTH AFTER USE) ***COPD; Therapy: 20OCG0391 to (Last Rx:62Dnb9172)  Requested for: 24AHV3041 Ordered   36  Tamsulosin HCl - 0 4 MG Oral Capsule; TAKE ONE TABLET BY MOUTH AT BEDTIME; Therapy: 98DUF9713 to Recorded   37  Thick-It Oral Powder; USE AS DIRECTED; Therapy: 27RSP5760 to (Evaluate:27Zob8469)  Requested for: 08Ptv3990; Last    Rx:45Rxz6558 Ordered   38  True Metrix Blood Glucose Test In Vitro Strip; TEST BS ONCE DAILY IN AM BEFORE    BREAKFAST  NOTIFY PCP IF <50 OR>400 E11 9; Therapy: 23PEE0183 to (Evaluate:15Oct2017); Last Rx:20Oct2016 Ordered   39  TRUEtest Test In Vitro Strip; Test blood glucose once in the AM before breakfast and    then again at 4 PM;    Therapy: 24HSA2886 to (Evaluate:13Oct2016)  Requested for: 64QAM1533; Last    Rx:19Oct2015 Ordered   40  Tylenol 325 MG Oral Tablet; Therapy: (Recorded:05Jun2013) to Recorded   41  Vitamin D3 2000 UNIT Oral Capsule; Therapy: 90MEE6221 to (Last Rx:10Jan2012)  Requested for: 42WVG7603 Ordered    Allergies    1  Carbepenems   2  Cephalosporins   3  Levaquin TABS   4  Penicillins    5  Dairy   6  Other    Vitals   Recorded: 29KSI7505 01:11PM   Temperature 97 3 F   Heart Rate 84   Respiration 18   Systolic 452   Diastolic 78   Height 5 ft 4 in   Weight 131 lb 6 oz   BMI Calculated 22 55   BSA Calculated 1 64     Physical Exam    Constitutional   General appearance: No acute distress, well appearing and well nourished  Eyes   Conjunctiva and lids: No erythema, swelling or discharge  Pupils and irises: Abnormal   Pupils: nonreactive to light on the right and nonreactive to light on the left  Cornea, Lens, and Sclera:   Ophthalmoscopic examination: Normal fundi and optic discs  Ears, Nose, Mouth, and Throat   External inspection of ears and nose: Normal     Otoscopic examination: Tympanic membranes translucent with normal light reflex  Canals patent without erythema   moderate cerumen bilaterally     Hearing: Normal     Nasal mucosa, septum, and turbinates: Normal without edema or erythema  Lips, teeth, and gums: Normal, good dentition  Oropharynx: Normal with no erythema, edema, exudate or lesions  Neck   Neck: Supple, symmetric, trachea midline, no masses  Thyroid: Normal, no thyromegaly  Pulmonary   Respiratory effort: No increased work of breathing or signs of respiratory distress  Palpation of chest: Normal     Auscultation of lungs: Clear to auscultation  Cardiovascular   Auscultation of heart: Normal rate and rhythm, normal S1 and S2, no murmurs  Pedal pulses: 2+ bilaterally  Examination of extremities for edema and/or varicosities: Normal     Chest   Chest: Normal     Abdomen   Abdomen: Non-tender, no masses  Liver and spleen: No hepatomegaly or splenomegaly  Examination for hernias: No hernias appreciated  Lymphatic   Palpation of lymph nodes in neck: No lymphadenopathy  Musculoskeletal   Gait and station: Abnormal   Gait evaluation demonstrated ataxia, non-weight bearing bilaterally and uses a walker to ambulate  Inspection/palpation of digits and nails: Normal without clubbing or cyanosis  Inspection/palpation of joints, bones, and muscles: Normal     Range of motion: Normal     Stability: Normal     Muscle strength/tone: Abnormal   poor muscular tone  Skin   Skin and subcutaneous tissue: Normal without rashes or lesions  Palpation of skin and subcutaneous tissue: Normal turgor  Neurologic   Cranial nerves: Cranial nerves 2-12 intact  Reflexes: 2+ and symmetric  Sensation: No sensory loss  Psychiatric   Judgment and insight: Normal     Orientation to person, place and time: Normal     Recent and remote memory: Intact  Mood and affect: Normal        Attending Note  Attending Note: Attending Note: I discussed the case with the Resident and reviewed the Resident's note, I supervised the Resident and I agree with the Resident management plan as it was presented to me   Level of Participation: I was present in clinic and examined the patient  Comments/Additional Findings: HM examination  HTN at goal  T2DM, check A1C  I agree with the Resident's note  Future Appointments    Date/Time Provider Specialty Site   11/29/2017 01:00 PM THEODORA Hsu   [address]     Signatures   Electronically signed by : THEODORA Chatterjee ; Oct 25 2017  8:56PM EST                       (Author)    Electronically signed by : THEODORA No ; Oct 28 2017 10:53AM EST                       (Author)

## 2018-01-11 NOTE — PROCEDURES
Procedures by GERALDINE Gomez at 3/3/2017 11:24 AM      Author:  GERALDIEN Gomez Service:  (none) Author Type:  Speech and Language Pathologist     Filed:  3/3/2017 12:13 PM Date of Service:  3/3/2017 11:24 AM Status:  Signed     :  GERALDINE Gomez (Speech and Language Pathologist)         Pre-procedure Diagnoses:       1  Problems with swallowing and mastication [R13 10]       2  Feeding difficulties and mismanagement [R63 3]       3  Other abnormal clinical finding [R68 89]                Procedures:       1  FL BARIUM Lynette Carls SPEECH [IIA629 (Custom)]                                                      Video Swallow Study      Patient Name: Alex Watters  AHWFN'L Date: 3/3/2017  par  par  Past Medical History  No past medical history on file  par  Past Surgical History  Past Surgical History       Procedure   Laterality Date     cystourethro w/implant  N/A 1/14/2016     Procedure: CYSTOSCOPY WITH INSERTION UROLIFT;  Surgeon: Micki Gilbert MD;  Location: [facility];  Service: Urology         Pt is an 81 y/o male referred for outpatient video barium swallow study by MD and primary SLP due to new onset of coughing/choking on current diet  Caregiver reports coughing during meals  resulting in vomiting x2 in the past week  He was downgraded to honey thickened liquids and a soft chopped diet by MD and SLP until VBS could be completed  PMH: Anemia, intellectual disability, COPD, DM, dysphagia, GERD, hypercholesterolemia, hyerkalemia, hypothyroidism, osteoporosis      Previous VBS: 8/2/2013: Recommendations made for dysphagia level 3/chopped diet with thin liquids  Mildly prolonged mastication was noted with solid textures with piecemeal bolus formation and transfer  No premature spill of material  was observed  No pharyngeal retention or aspiration observed  Transient penetration with successive sips of thin       Current Diet: Mechanical soft, dysphagia level 3, honey thick liquids     Premorbid diet: Mechanical soft, thin    Dentition: Loose fitting dentures    O2 requirement: Room air    Oral mech:  Strength and ROM: WFL, difficulty following commands    Vocal Quality/Speech:  Voice strong, clear  Cognitive status: Difficulty following commands, cognitively impaired at baseline  Consistencies administered: Barium laden applesauce, peaches, hard solid cookie, honey thick, nectar thick, thin liquids  Liquids were administered by cup and straw  Pt was seated laterally at 90 degrees  Pt independently completes slight neck flexion during swallow, especially with liquids  Frequent movement during study, moving out of view at times  Required frequent cues for sitting still  Oral stage:  Mild-Moderate  Lip closure: Adequate  Mastication: Prolonged with hard solids  Incomplete bolus breakdown with hard cookie (sending partially masticated pieces to pharynx)  Appeared [facility] for diced peaches  Bolus formation: Incomplete at times  Bolus control: Decreased, premature spill noted of all trials  Transfer: Mild delay  Residue: Mild oral residue clears with liquid wash  BOT residue noted with solids which he did not spontaneously clear  Pharyngeal stage:  Mild  Swallow promptness: Mild delay  Spill to valleculae: Noted with all solids and liquids  Spill to pyriforms: Overflow spill to pyriforms (from oral residue) with NTL by cup  Epiglottic inversion: Inconsistent  Laryngeal rise: Reduced anterior movement  Pharyngeal constriction: Appears WFL, no significant pharyngeal retention  Vallecular retention: Minimal with solids and liquids  Pyriform retention: Minimal coating with solids and liquids in pyriforms  PPW coating: Not observed  CP prominence: ? CP bar  Retropulsion from prominence: Some visible barium noted in cervical esophagus after the swallow  ? If retropulsion from below     Transient penetration: Penetration observed with thin liquids (successsive sips)  Cleared spontaneously  Aspiration: No aspiration observed on this study  Cough was observed during session however no material was observed in the airway  Cough did not appear related to the swallow  Strategies: May benefit from slow rate, small bites/sips, alternation of solids and liquids      Screening of Esophageal stage: May benefit from additional GI input  Backflow of material from stomach into esophagus observed (Questionable reflux/retropulsion) with esophageal screen  Dysphagia symptoms may be related to GI concern  Summary:   Pt is demonstrating mild-moderate oral and mild pharyngeal dysphagia as per today's assessment  Mastication of solid textures was incomplete resulting in un-masticated pieces of hard cookie being sent to the pharynx  Mild oral retention was  observed in tongue and BOT  AP transfer was delayed with mild reduced oral control  Premature spill was observed to the vallecula with all consistencies assessed, to the pyriforms with NTL (appears related to oral residue overflow)  Swallow was mildly  delayed with decreased anterior movement of the hyoid  Pharyngeal constriction appears WFL with minimal retention noted in vallecula and pyriforms post swallow  Transient penetration was observed with successive sips of thin liquids which spontaneously  cleared  No aspiration was observed on this study  Intermittent coughing was observed however it did not appear related to the swallow  However, barium material was observed in cervical esophagus post swallow with pureed textures ? Retropulsion  Backflow  of material was observed in lower esophagus from the stomach  Would consider GI consult/intervention  Recommendations:  Diet: As per primary SLP due to need for continued assessment of clinical tolerance  Consider dysphagia level 2, soft moist cohesive food items  Liquids:  Thin as tolerated with assessment of tolerance with primary SLP  Meds: Crush in puree  Strategies: Slow rate, small bites/sips, alternation of solids and liquids, cues for secondary swallow as able  Upright position  F/u ST tx: Yes with primary SLP  Full Supervision   Aspiration Precautions  GI/Reflux Precautions  Consider consult with: GI  Results reviewed with: caregiver    If a dedicated assessment of the esophagus is desired, consider esophagram/barium swallow                            Received for:Provider  EPIC   Mar  3 2017 12:13PM [facility] Standard Time

## 2018-01-12 VITALS
SYSTOLIC BLOOD PRESSURE: 140 MMHG | HEIGHT: 64 IN | WEIGHT: 131.38 LBS | BODY MASS INDEX: 22.43 KG/M2 | DIASTOLIC BLOOD PRESSURE: 78 MMHG | RESPIRATION RATE: 18 BRPM | TEMPERATURE: 97.3 F | HEART RATE: 84 BPM

## 2018-01-12 VITALS
BODY MASS INDEX: 22.28 KG/M2 | TEMPERATURE: 97.2 F | SYSTOLIC BLOOD PRESSURE: 138 MMHG | WEIGHT: 130.5 LBS | HEIGHT: 64 IN | DIASTOLIC BLOOD PRESSURE: 68 MMHG | RESPIRATION RATE: 12 BRPM | HEART RATE: 68 BPM

## 2018-01-13 VITALS
DIASTOLIC BLOOD PRESSURE: 60 MMHG | SYSTOLIC BLOOD PRESSURE: 118 MMHG | HEIGHT: 64 IN | RESPIRATION RATE: 12 BRPM | BODY MASS INDEX: 22.26 KG/M2 | HEART RATE: 66 BPM | TEMPERATURE: 98.6 F | WEIGHT: 130.38 LBS

## 2018-01-14 VITALS
DIASTOLIC BLOOD PRESSURE: 80 MMHG | RESPIRATION RATE: 18 BRPM | WEIGHT: 139 LBS | HEIGHT: 64 IN | TEMPERATURE: 98 F | SYSTOLIC BLOOD PRESSURE: 120 MMHG | BODY MASS INDEX: 23.73 KG/M2 | HEART RATE: 76 BPM

## 2018-01-14 VITALS
BODY MASS INDEX: 23.79 KG/M2 | TEMPERATURE: 98.5 F | DIASTOLIC BLOOD PRESSURE: 60 MMHG | HEART RATE: 78 BPM | SYSTOLIC BLOOD PRESSURE: 118 MMHG | HEIGHT: 64 IN | WEIGHT: 139.38 LBS | RESPIRATION RATE: 16 BRPM

## 2018-01-14 NOTE — RESULT NOTES
Verified Results  Brettalvaro Merline - Eye Exam [id number] 12:00AM Mike Francois     Test Name Result Flag Reference   Eye Exam [id number]        Summary / No summary entered :      No summary entered   Documents attached :      sOpthalmology - Mike Francois; Enc: [id number] - Image Encounter -      Mike Francois - [facility]) (Result Document)

## 2018-01-22 VITALS
SYSTOLIC BLOOD PRESSURE: 126 MMHG | BODY MASS INDEX: 22.02 KG/M2 | HEART RATE: 80 BPM | HEIGHT: 64 IN | DIASTOLIC BLOOD PRESSURE: 70 MMHG | WEIGHT: 129 LBS | RESPIRATION RATE: 18 BRPM | TEMPERATURE: 98.2 F

## 2018-01-23 VITALS
DIASTOLIC BLOOD PRESSURE: 74 MMHG | BODY MASS INDEX: 22.9 KG/M2 | SYSTOLIC BLOOD PRESSURE: 110 MMHG | WEIGHT: 129.25 LBS | HEART RATE: 84 BPM | RESPIRATION RATE: 16 BRPM | TEMPERATURE: 96.7 F

## 2018-01-23 NOTE — RESULT NOTES
Verified Results  Urine Dip Non-Automated- POC [id number] 10:10AM Miroslava Mejia     Test Name Result Flag Reference   Color Yellow     Clarity Transparent     Leukocytes negative     Nitrite negative     Blood negative     Bilirubin negative     Urobilinogen 0 2     Protein negative     Ph 6 0     Specific Gravity 1 020     Ketone negative     Glucose 2000     Color Yellow     Clarity Transparent     Leukocytes negative     Nitrite negative     Blood negative     Bilirubin negative     Urobilinogen 0 2     Protein negative     Ph 6 0     Specific Gravity 1 020     Ketone negative     Glucose 2000

## 2018-01-23 NOTE — RESULT NOTES
Verified Results  (1) URINALYSIS (will reflex a microscopy if leukocytes, occult blood, protein or nitrites are not within normal limits) 71Evk9503 01:04PM Sigrid Mejiachris Order Number: [id number]     Test Name Result Flag Reference   COLOR Yellow     CLARITY Clear     SPECIFIC GRAVITY UA 1 023  1 003-1 030   PH UA 6 0  4 5-8 0   LEUKOCYTE ESTERASE UA  A Negative   Elevated glucose may cause decreased leukocyte values   See urine microscopic for [facility] result/   NITRITE UA Negative  Negative   PROTEIN UA Negative mg/dl  Negative   GLUCOSE UA >=1000 (1%) mg/dl A Negative   KETONES UA Negative mg/dl  Negative   UROBILINOGEN UA 0 2 E U /dl  0 2, 1 0 E U /dl   BILIRUBIN UA Negative  Negative   BLOOD UA Negative  Negative

## 2018-01-23 NOTE — RESULT NOTES
Verified Results  (1) URINALYSIS (will reflex a microscopy if leukocytes, occult blood, protein or nitrites are not within normal limits) [id number] 01:04PM Edith Mejia Order Number: [id number]     Test Name Result Flag Reference   COLOR Yellow     CLARITY Clear     SPECIFIC GRAVITY UA 1 023  1 003-1 030   PH UA 6 0  4 5-8 0   LEUKOCYTE ESTERASE UA  A Negative   Elevated glucose may cause decreased leukocyte values   See urine microscopic for [facility] result/   NITRITE UA Negative  Negative   PROTEIN UA Negative mg/dl  Negative   GLUCOSE UA >=1000 (1%) mg/dl A Negative   KETONES UA Negative mg/dl  Negative   UROBILINOGEN UA 0 2 E U /dl  0 2, 1 0 E U /dl   BILIRUBIN UA Negative  Negative   BLOOD UA Negative  Negative   BACTERIA None Seen /hpf  None Seen, Occasional   EPITHELIAL CELLS None Seen /hpf  None Seen, Occasional   HYALINE CASTS None Seen /lpf  None Seen   RBC UA None Seen /hpf  None Seen, 0-5   WBC UA None Seen /hpf  None Seen, 0-5, 5-55, 5-65       Plan  Chronic obstructive pulmonary disease, Osteoporosis    · Hospital Bed Institutional Type; Status:Complete;   Done: [id number]  Left sided abdominal pain    · (1) URINALYSIS (will reflex a microscopy if leukocytes, occult blood, protein or nitrites  are not within normal limits); Status:Complete;   Done: [id number] 01:04PM  Status post fall    · *1 - [facility]  *  Status: Active  Requested for: 19Ytk7873  Care Summary provided  : Yes

## 2018-03-25 PROBLEM — R53.81 PHYSICAL DECONDITIONING: Status: ACTIVE | Noted: 2017-11-29

## 2018-03-25 RX ORDER — CHOLECALCIFEROL (VITAMIN D3) 125 MCG
1 CAPSULE ORAL 3 TIMES DAILY PRN
COMMUNITY

## 2018-03-25 RX ORDER — ETOH/EUC OIL/MENTH/PEP/WINTERG
1 SPRAY, NON-AEROSOL (ML) MUCOUS MEMBRANE AS NEEDED
COMMUNITY
Start: 2016-09-01

## 2018-03-25 RX ORDER — LANCETS 32 GAUGE
1 EACH MISCELLANEOUS DAILY
COMMUNITY
Start: 2014-08-19

## 2018-03-25 RX ORDER — BLOOD PRESSURE TEST KIT
1 KIT MISCELLANEOUS DAILY
COMMUNITY
Start: 2016-10-20

## 2018-03-25 RX ORDER — SODIUM FLUORIDE 5 MG/ML
1 PASTE, DENTIFRICE DENTAL DAILY
COMMUNITY
Start: 2011-09-02

## 2018-04-23 ENCOUNTER — OFFICE VISIT (OUTPATIENT)
Dept: AUDIOLOGY | Age: 83
End: 2018-04-23

## 2018-04-23 DIAGNOSIS — H90.3 SENSORINEURAL HEARING LOSS, BILATERAL: Primary | ICD-10-CM

## 2018-04-23 NOTE — PROGRESS NOTES
Hearing aid visit:    Name:  Yemi Cadena  :  1932  Age:  80 y o  Date of Evaluation: 18     Yemi Cadena is being seen for a hearing aid visit  Yemi Cadena   is fit binaurally  with Porfirio GOODE hearing aid(s) serial number [id number] right/ serial number [id number] left  Warranty 18  Patient reports heairng aids are not working  Action:  Both batteries were dead, replaced batteries  Hearing aids are in good working condition, with good sound quality  Cleaned and checked hearing aids  Re-tubed earmolds, right earmold has a tare in the tube hole  Patient ears are totally occlueded, could not visualize eardrums bilaterally  Recommended to patient and caregiver that he have his ears cleaned by PCP or ENT  Recommended that they use Debrox prior to their ear cleaning appointment  Recommended if the feedback does not stop in the right ear after it has been cleaned to get a new earmold  Quoted $75 for a new earmold       Regina Paredes , CCC-A  Clinical Audiologist

## 2018-08-02 ENCOUNTER — DOCUMENTATION (OUTPATIENT)
Dept: AUDIOLOGY | Age: 83
End: 2018-08-02

## 2018-08-02 DIAGNOSIS — H90.3 SENSORINEURAL HEARING LOSS, BILATERAL: Primary | ICD-10-CM

## 2018-08-02 NOTE — PROGRESS NOTES
Pt's caregiver dropped off both of pt's hearing aids with left earmold missing  Right earmold is torn  Both hearing aids are in good working condition  Lana Lindquist, pt's caregiver, and advised he needs new earmolds and they need to schedule appt for Janette Oneil Done said they will  Has before appointment from F/D if he needs them

## 2018-08-15 ENCOUNTER — OFFICE VISIT (OUTPATIENT)
Dept: AUDIOLOGY | Age: 83
End: 2018-08-15
Payer: MEDICARE

## 2018-08-15 DIAGNOSIS — H90.3 SENSORINEURAL HEARING LOSS, BILATERAL: Primary | ICD-10-CM

## 2018-08-15 PROCEDURE — V5264 EAR MOLD/INSERT: HCPCS | Performed by: AUDIOLOGIST-HEARING AID FITTER

## 2018-08-15 NOTE — PROGRESS NOTES
Progress Note    Name:  Blossom Sofia  :  1932  Age:  80 y o  Date of Evaluation: 08/15/18     Ariela Bray and his caregiver came in today for ear mold impressions, bilaterally  Benny's caregiver previously dropped off his hearing aids with a right torn mold and missing left mold  I gave back the hearing aids to the caregiver to hold onto, until we get the new molds in for fitting  Ear mold impressions were made today without incident  Will send out molds to lab to be made  Recommendations: Will call patient for EMP when ear molds RFF  Caregiver should be noted to bring check for $150 00 made payable to [facility]        Regina Webb   Clinical Audiologist

## 2018-09-24 ENCOUNTER — OFFICE VISIT (OUTPATIENT)
Dept: AUDIOLOGY | Age: 83
End: 2018-09-24
Payer: MEDICARE

## 2018-09-24 DIAGNOSIS — H90.3 SENSORINEURAL HEARING LOSS, BILATERAL: Primary | ICD-10-CM

## 2018-09-24 PROCEDURE — V5264 EAR MOLD/INSERT: HCPCS | Performed by: AUDIOLOGIST-HEARING AID FITTER

## 2018-09-24 NOTE — PROGRESS NOTES
Progress Note    Name:  [facility]  :  1932  Age:  80 y o  Date of Evaluation: 18     Pt here today to  new molds  Both molds fit and tubing was cut to appropriate length  Pt paid $150 00 today  Recommendations: Follow up as needed        Regina Webb , CCC-A  Clinical Audiologist

## 2018-10-30 ENCOUNTER — OFFICE VISIT (OUTPATIENT)
Dept: UROLOGY | Facility: CLINIC | Age: 83
End: 2018-10-30
Payer: MEDICARE

## 2018-10-30 VITALS — WEIGHT: 122.4 LBS | BODY MASS INDEX: 21.69 KG/M2 | HEIGHT: 63 IN

## 2018-10-30 DIAGNOSIS — N40.1 BENIGN PROSTATIC HYPERPLASIA WITH LOWER URINARY TRACT SYMPTOMS, SYMPTOM DETAILS UNSPECIFIED: Primary | ICD-10-CM

## 2018-10-30 PROCEDURE — 99213 OFFICE O/P EST LOW 20 MIN: CPT | Performed by: PHYSICIAN ASSISTANT

## 2018-10-30 NOTE — PROGRESS NOTES
UROLOGY PROGRESS NOTE   Patient Identifiers: Cora Recinos (MRN [id number])  Date of Service: 10/30/2018    Subjective:     72-year-old male history of BPH  He had a Urolift in 2015  He lives in a group home  He wears a diaper he is mostly in a wheelchair and is unable to give any sort of history  He is accompanied by a caregiver  Patient has  no complaints  Objective:     VITALS:    There were no vitals filed for this visit  LABS:  Lab Results   Component Value Date    HGB 11 2 (L) 01/07/2016    HCT 34 5 (L) 01/07/2016    WBC 7 17 01/07/2016     01/07/2016   ]    Lab Results   Component Value Date     01/07/2016    K 4 6 01/07/2016     01/07/2016    CO2 26 01/07/2016    BUN 34 (H) 01/07/2016    CREATININE 1 00 01/07/2016    CALCIUM 9 5 01/07/2016    GLUCOSE 93 01/07/2016   ]        INPATIENT MEDS:    Current Outpatient Prescriptions:     acetaminophen (TYLENOL) 325 mg tablet, Take 1 tablet by mouth every 6 (six) hours as needed, Disp: , Rfl:     Alcohol Swabs PADS, 1 application by Does not apply route daily, Disp: , Rfl:     alendronate (FOSAMAX) 70 mg tablet, Take 70 mg by mouth every 7 days  Take in the morning with a full glass of water, on an empty stomach, and do not take anything else by mouth or lie down for the next 30 min  Last dose sat  1/9, Disp: , Rfl:     ARIPiprazole (ABILIFY) 5 mg tablet, Take 5 mg by mouth daily  , Disp: , Rfl:     aspirin 325 mg tablet, Take 325 mg by mouth daily  , Disp: , Rfl:     benztropine (COGENTIN) 0 5 mg tablet, Take 0 5 mg by mouth daily  , Disp: , Rfl:     budesonide-formoterol (SYMBICORT) 160-4 5 mcg/act inhaler, Inhale 2 puffs 2 (two) times a day , Disp: , Rfl:     calcium acetate (PHOSLO) 667 mg capsule, Take 667 mg by mouth 2 (two) times a day  , Disp: , Rfl:     Cholecalciferol (D3 ADULT PO), Take 2,000 Units by mouth daily  , Disp: , Rfl:     Disposable Gloves (NITRILE GLOVES MEDIUM) MISC, 1 application by Does not apply route as needed, Disp: , Rfl:     doxazosin (CARDURA) 2 mg tablet, Take 2 mg by mouth daily at bedtime  , Disp: , Rfl:     EASY TOUCH LANCETS 08R/BDVLX MISC, 1 application by Does not apply route daily, Disp: , Rfl:     ferrous sulfate 325 (65 Fe) mg tablet, Take 325 mg by mouth daily with breakfast Monday Wednesday Friday , Disp: , Rfl:     fluticasone (FLONASE) 50 mcg/act nasal spray, 1 spray into each nostril daily  , Disp: , Rfl:     glucose blood (TRUETEST TEST) test strip, 1 application by In Vitro route as needed, Disp: , Rfl:     hydrochlorothiazide (MICROZIDE) 12 5 mg capsule, Take 12 5 mg by mouth daily, Disp: , Rfl:     Incontinence Supply Disposable (PROTECTIVE UNDERWEAR SM/MED) MISC, 1 application by Does not apply route as needed, Disp: , Rfl:     lactase (LACTAID) 3,000 units tablet, Take 1 tablet by mouth 3 (three) times a day as needed, Disp: , Rfl:     levothyroxine 50 mcg tablet, Take 50 mcg by mouth daily  , Disp: , Rfl:     loratadine (CLARITIN) 10 mg tablet, Take 10 mg by mouth daily  , Disp: , Rfl:     LORazepam (ATIVAN) 0 5 mg tablet, Take 0 5 mg by mouth daily  , Disp: , Rfl:     lovastatin (MEVACOR) 40 MG tablet, Take 40 mg by mouth daily at bedtime  , Disp: , Rfl:     magnesium hydroxide (MILK OF MAGNESIA) 400 mg/5 mL oral suspension, Take 30 mL by mouth daily at bedtime as needed for constipation, Disp: , Rfl:     metFORMIN (GLUCOPHAGE) 1000 MG tablet, Take 1,000 mg by mouth 2 (two) times a day with meals  , Disp: , Rfl:     montelukast (SINGULAIR) 10 mg tablet, Take 10 mg by mouth daily At 4pm , Disp: , Rfl:     omeprazole (PriLOSEC) 20 mg delayed release capsule, Take 20 mg by mouth daily  , Disp: , Rfl:     oxybutynin (DITROPAN-XL) 5 mg 24 hr tablet, Take 5 mg by mouth daily at bedtime, Disp: , Rfl:     sertraline (ZOLOFT) 50 mg tablet, Take 50 mg by mouth daily  , Disp: , Rfl:     sodium chloride (OCEAN) 0 65 % nasal spray, 1 spray into each nostril 3 (three) times a day as needed for congestion  , Disp: , Rfl:     SODIUM FLUORIDE, DENTAL GEL, (DENTA 5000 PLUS) 1 1 % CREA, Take 1 application by mouth daily, Disp: , Rfl:     STARCH-MALTO DEXTRIN (THICK-IT) POWD, Take 1 application by mouth as needed, Disp: , Rfl:     tamsulosin (FLOMAX) 0 4 mg, Take 0 4 mg by mouth daily with dinner , Disp: , Rfl:       Physical Exam:   Ht 5' 3" (1 6 m)   Wt 55 5 kg (122 lb 6 4 oz)   BMI 21 68 kg/m²   GEN: no acute distress    RESP: breathing comfortably with no accessory muscle use    ABD: soft, non-tender, non-distended   INCISION:    EXT: no significant peripheral edema   (Male): Penis circumcised, phallus normal, meatus patent  Testicles descended into scrotum bilaterally without masses nor tenderness  No inguinal hernias bilaterally  MACARIO: Prostate is not enlarged at 30 grams  The prostate is not boggy  The prostate is not tender  No nodules noted      RADIOLOGY:     None     Assessment:    1   BPH     Plan:   - there are no significant ongoing urologic concerns  - he may follow up on an as-needed basis  -  -

## 2018-11-13 ENCOUNTER — OFFICE VISIT (OUTPATIENT)
Dept: AUDIOLOGY | Age: 83
End: 2018-11-13
Payer: MEDICARE

## 2018-11-13 DIAGNOSIS — H90.3 SENSORINEURAL HEARING LOSS, BILATERAL: Primary | ICD-10-CM

## 2018-11-13 PROCEDURE — 92567 TYMPANOMETRY: CPT | Performed by: AUDIOLOGIST

## 2018-11-13 PROCEDURE — 92557 COMPREHENSIVE HEARING TEST: CPT | Performed by: AUDIOLOGIST

## 2018-11-13 NOTE — PROGRESS NOTES
HEARING EVALUATION/HEARING AID VISIT    Name:  Nelly Armenta  :  1932  Age:  80 y o  Date of Evaluation: 18     History: known hearing loss, wears Belkis Ignite 30 BTEs  Reason for visit: Nelly Armenta is being seen today at the request of Dr Martha Mario for an evaluation of hearing  Caregiver reports hearing aids are not working  Otoscopic Evaluation:   Right Ear: Moderate cerumen   Left Ear: Moderate cerumen    Tympanometry:   Right: Type As - reduced compliance   Left: Type As - reduced compliance      Audiogram Results: Moderately severe to profound sensorineural hearing loss bilaterally  Stable to 2017 audiogram  WRS in right ear was 70%, in left ear was 90%  *see attached audiogram      Nelly Armenta is fit binaurally with Jory Gironer Ignite 30 BTE hearing aid(s) serial number [id number] right/ serial number [id number] left  Warranty  2018  Action:  Changed batteries and cleaned hearing aids  Good working condition       RECOMMENDATIONS:  Annual hearing eval, Return to [facility]  for F/U, Consistent Use of Amplification and Copy to Patient/Caregiver      Marybel Arndt, Audiology Intern  Regina Irving , CCC-A  Clinical Audiologist

## 2018-11-13 NOTE — LETTER
2018     Steve Hurtado MD  [facility] [address]    Patient: Shirin Okeefe   YOB: 1932   Date of Visit: 2018       Dear Dr Monik Perez:    Thank you for referring Toro Kirkpatrick to me for evaluation  Below are my notes for this consultation  If you have questions, please do not hesitate to call me  I look forward to following your patient along with you  Sincerely,        Erica Robertson        CC: No Recipients  Erica Robertson  2018 10:43 AM  Sign at close encounter  [address] VISIT    Name:  Shirin Okeefe  :  1932  Age:  80 y o  Date of Evaluation: 18     History: known hearing loss, wears Belkis Ignite 30 BTEs  Reason for visit: Shirin Okeefe is being seen today at the request of Dr Monik Perez for an evaluation of hearing  Caregiver reports hearing aids are not working  Otoscopic Evaluation:   Right Ear: Moderate cerumen   Left Ear: Moderate cerumen    Tympanometry:   Right: Type As - reduced compliance   Left: Type As - reduced compliance      Audiogram Results: Moderately severe to profound sensorineural hearing loss bilaterally  Stable to 2017 audiogram  WRS in right ear was 70%, in left ear was 90%  *see attached audiogram      Shirin Okeefe is fit binaurally with Jesenia Section Ignite 30 BTE hearing aid(s) serial number [id number] right/ serial number [id number] left  Warranty  2018  Action:  Changed batteries and cleaned hearing aids  Good working condition       RECOMMENDATIONS:  Annual hearing eval, Return to [facility]  for F/U, Consistent Use of Amplification and Copy to Patient/Caregiver      Erica Robertson, Audiology Intern  Regina Rogers , CCC-A  Clinical Audiologist

## 2019-03-13 NOTE — PROGRESS NOTES
3:18p 3/13/19    Pt's aide called  stating right hearing aid is lost and they are asking what L&D replacement fee would be   #[id number] (left) #[id number] (right)  Warranty 18  Pt fit on 12 through [facility]  L&D warranty  in   Pt eligible to apply through [facility] again  Left message for Raghu Crespo to update aide to determine how they want to proceed

## 2019-04-12 ENCOUNTER — OFFICE VISIT (OUTPATIENT)
Dept: AUDIOLOGY | Age: 84
End: 2019-04-12

## 2019-04-12 DIAGNOSIS — H90.3 SENSORY HEARING LOSS, BILATERAL: Primary | ICD-10-CM

## 2019-06-05 NOTE — PROGRESS NOTES
6/5/19  [employer] authorization received  EM PO# [id number]  Patient had ems made 8/18  Left vm for caregiver asking if Donneta Hatchet needs new earmolds  If yes, we'll schedule PABLO  If not, wait for hearing aids and then schedule HAP

## 2019-07-11 ENCOUNTER — OFFICE VISIT (OUTPATIENT)
Dept: AUDIOLOGY | Age: 84
End: 2019-07-11

## 2019-07-11 DIAGNOSIS — H90.3 SENSORINEURAL HEARING LOSS, BILATERAL: Primary | ICD-10-CM

## 2019-07-11 NOTE — PROGRESS NOTES
Progress Note    Name:  Tung Correa  :  1932  Age:  80 y o  Date of Evaluation: 19     Patient scheduled for hearing aid pickup  Otoscopy revealed cerumen occlusion bilaterally  Could not fit instruments due to presence of feedback  Caregiver will schedule hearing aid fitting following ear cleaning  Patient's previous earmolds will be brought to this appointment        Regina Bryant   Clinical Audiologist

## 2019-07-24 ENCOUNTER — OFFICE VISIT (OUTPATIENT)
Dept: AUDIOLOGY | Age: 84
End: 2019-07-24

## 2019-07-24 DIAGNOSIS — H90.3 SENSORINEURAL HEARING LOSS, BILATERAL: Primary | ICD-10-CM

## 2019-07-24 NOTE — PROGRESS NOTES
Hearing Aid Fitting    Name:  Pooja Moore  :  1932  Age:  80 y o  Date of Evaluation: 19     Pooja Moore is being see today to be fit with new hearing aids  Patient is fit with Flory Watkins  hearing aid(s)  Right serial number [id number]  Left serial number [id number]  Warranty date: 2022 (repair only)  No loss/damage coverage  Ear mold Battery   Right Half shell 13   Left Half shell 13     The hearing aid(s) were adjusted based on the patient's most recent audiogram and patient comfort  The right hearing aid was fit with patient's existing earmold  No left earmold was available today  Patient noted good sound quality, and was happy with the fitting  Left instrument was found to be dead - sent to [facility] for repair  Care and cleaning of the hearing aids was reviewed  Batteries and user manual were reviewed with the caregiver  Insertion and removal of the hearing aids was done  The caregiver practiced insertion and removal of the devices in the office, they demonstrated excellent ability to manipulate the hearing aids  The patient expressed satisfaction with the hearing aid  Earmold impressions were taken bilaterally  Caregiver will consider purchase of Loss/Damage warranty ($106 each hearing aid, per year)  Recommendation:   Anthony Swartz, will be contacted ([phone]) when hearing aid returns from repair and earmolds arrive         Regina Menchaca   Clinical Audiologist

## 2019-07-31 NOTE — PROGRESS NOTES
Progress Note    Name:  Katalina Granado  :  1932  Age:  80 y o  Date of Evaluation: 19     Patient's repaired SLOAN arrived  Edwardalbania Sanchez  BTE 13  Serial Number: [id number]  Warranty: 2022    Still waiting for earmolds to arrive         Regina Oliver , CCC-A  Clinical Audiologist

## 2019-08-02 NOTE — PROGRESS NOTES
Progress Note    Name:  Truong Stewart  :  1932  Age:  80 y o  Date of Evaluation: 19     [employer] Invoice# [id number]  Clear half shell earmolds   R# [id number]  C#[id number]  HEAR NOW    Will wallace Cerrato to schedule HAP        Regina Reinoso   Clinical Audiologist

## 2019-08-23 ENCOUNTER — OFFICE VISIT (OUTPATIENT)
Dept: AUDIOLOGY | Age: 84
End: 2019-08-23

## 2019-08-23 DIAGNOSIS — H90.3 SENSORINEURAL HEARING LOSS, BILATERAL: Primary | ICD-10-CM

## 2019-08-23 NOTE — PROGRESS NOTES
Progress Note    Name:  Katalina Granado  :  1932  Age:  80 y o  Date of Evaluation: 19     Fit new earmolds - excellent fit  Fit repaired left instrument  Patient reports good sound quality  Advised caregiver to schedule 3 month HAV  Caregiver will contact center with any concerns        Regina Temple   Clinical Audiologist

## 2019-11-29 ENCOUNTER — DOCUMENTATION (OUTPATIENT)
Dept: AUDIOLOGY | Age: 84
End: 2019-11-29

## 2019-11-29 NOTE — PROGRESS NOTES
Progress Note    Name:  Shilpa To  :  1932  Age:  80 y o  Date of Evaluation: 19     Scanned in HA chart         Regina Vinson , CCC-A  Clinical Audiologist